# Patient Record
Sex: MALE | Race: WHITE | NOT HISPANIC OR LATINO | URBAN - NONMETROPOLITAN AREA
[De-identification: names, ages, dates, MRNs, and addresses within clinical notes are randomized per-mention and may not be internally consistent; named-entity substitution may affect disease eponyms.]

---

## 2021-10-22 ENCOUNTER — LAB (OUTPATIENT)
Dept: LAB | Facility: HOSPITAL | Age: 26
End: 2021-10-22

## 2021-10-22 ENCOUNTER — TRANSCRIBE ORDERS (OUTPATIENT)
Dept: LAB | Facility: HOSPITAL | Age: 26
End: 2021-10-22

## 2021-10-22 DIAGNOSIS — Z20.822 COVID-19 RULED OUT: ICD-10-CM

## 2021-10-22 DIAGNOSIS — Z20.822 COVID-19 RULED OUT: Primary | ICD-10-CM

## 2021-10-22 LAB — SARS-COV-2 RNA NOSE QL NAA+PROBE: NOT DETECTED

## 2021-10-22 PROCEDURE — U0004 COV-19 TEST NON-CDC HGH THRU: HCPCS

## 2022-02-07 ENCOUNTER — LAB (OUTPATIENT)
Dept: LAB | Facility: HOSPITAL | Age: 27
End: 2022-02-07

## 2022-02-07 DIAGNOSIS — Z03.818 ENCOUNTER FOR PATIENT CONCERN ABOUT EXPOSURE TO INFECTIOUS ORGANISM: Primary | ICD-10-CM

## 2022-02-07 PROCEDURE — U0004 COV-19 TEST NON-CDC HGH THRU: HCPCS

## 2022-02-08 LAB — SARS-COV-2 RNA NOSE QL NAA+PROBE: DETECTED

## 2024-06-14 DIAGNOSIS — Z31.9 INFERTILITY MANAGEMENT: Primary | ICD-10-CM

## 2024-06-17 LAB
CHARACTER SMN: NORMAL
COLOR SMN: NORMAL
LIQUEFACTION TIME SMN: NORMAL MIN
PH SMN: 9 [PH]
SPECIMEN VOL SMN: 4.5 ML (ref 2–5)
SPERM # SMN: 34.2 MILLIONS/ML
SPERM MORPHOLOGY COMMENT: NORMAL
SPERM MOTILE NFR SMN: 70 % MOTILE (ref 50–100)
VISC SMN: NORMAL CP

## 2024-06-17 PROCEDURE — 89320 SEMEN ANAL VOL/COUNT/MOT: CPT | Performed by: STUDENT IN AN ORGANIZED HEALTH CARE EDUCATION/TRAINING PROGRAM

## 2024-06-21 ENCOUNTER — TELEPHONE (OUTPATIENT)
Dept: OBSTETRICS AND GYNECOLOGY | Facility: CLINIC | Age: 29
End: 2024-06-21
Payer: COMMERCIAL

## 2024-06-21 NOTE — TELEPHONE ENCOUNTER
----- Message from Kathie SALAMANCA sent at 6/21/2024  3:34 PM EDT -----  Pt called @ 3:34 & asked for a callback from Dr Acevedo. He has some additional questions.     [phone]

## 2024-06-24 NOTE — TELEPHONE ENCOUNTER
Returned patient's call and answered all questions.    Josefina Acevedo MD   Obstetrics and Gynecology  [facility]

## 2025-03-10 ENCOUNTER — TRANSCRIBE ORDERS (OUTPATIENT)
Dept: ADMINISTRATIVE | Facility: HOSPITAL | Age: 30
End: 2025-03-10
Payer: COMMERCIAL

## 2025-03-10 DIAGNOSIS — M54.50 LOW BACK PAIN, UNSPECIFIED BACK PAIN LATERALITY, UNSPECIFIED CHRONICITY, UNSPECIFIED WHETHER SCIATICA PRESENT: Primary | ICD-10-CM

## 2025-03-12 ENCOUNTER — TRANSCRIBE ORDERS (OUTPATIENT)
Dept: ADMINISTRATIVE | Facility: HOSPITAL | Age: 30
End: 2025-03-12
Payer: COMMERCIAL

## 2025-03-12 DIAGNOSIS — M54.50 LOW BACK PAIN, UNSPECIFIED BACK PAIN LATERALITY, UNSPECIFIED CHRONICITY, UNSPECIFIED WHETHER SCIATICA PRESENT: Primary | ICD-10-CM

## 2025-04-10 ENCOUNTER — HOSPITAL ENCOUNTER (OUTPATIENT)
Dept: MRI IMAGING | Facility: HOSPITAL | Age: 30
Discharge: HOME | End: 2025-04-10
Admitting: INTERNAL MEDICINE
Payer: OTHER GOVERNMENT

## 2025-06-18 ENCOUNTER — TELEPHONE (OUTPATIENT)
Dept: PSYCHIATRY | Facility: CLINIC | Age: 30
End: 2025-06-18
Payer: OTHER GOVERNMENT

## 2025-06-18 NOTE — TELEPHONE ENCOUNTER
"Patient called the other day inquiring about marriage counseling within our office. I spoke with our manager and she stated the following, \"As a default, no we do not. The therapist works with individual clients. If the individual patient wants to pull a spouse or family member in for a session, that is something they have to discuss with the provider as part of their treatment plan.\". Tried contacting the patient to relay the above message but patient was unreachable. Left a vm letting the patient know and asked him to give us a call back with any questions.  "